# Patient Record
Sex: FEMALE | Race: WHITE | NOT HISPANIC OR LATINO
[De-identification: names, ages, dates, MRNs, and addresses within clinical notes are randomized per-mention and may not be internally consistent; named-entity substitution may affect disease eponyms.]

---

## 2023-05-02 ENCOUNTER — APPOINTMENT (OUTPATIENT)
Dept: INTERNAL MEDICINE | Facility: CLINIC | Age: 48
End: 2023-05-02
Payer: COMMERCIAL

## 2023-05-02 VITALS
WEIGHT: 143 LBS | SYSTOLIC BLOOD PRESSURE: 118 MMHG | BODY MASS INDEX: 27 KG/M2 | HEART RATE: 76 BPM | DIASTOLIC BLOOD PRESSURE: 83 MMHG | OXYGEN SATURATION: 98 % | TEMPERATURE: 98.4 F | HEIGHT: 61 IN

## 2023-05-02 DIAGNOSIS — Z80.0 FAMILY HISTORY OF MALIGNANT NEOPLASM OF DIGESTIVE ORGANS: ICD-10-CM

## 2023-05-02 DIAGNOSIS — Z00.00 ENCOUNTER FOR GENERAL ADULT MEDICAL EXAMINATION W/OUT ABNORMAL FINDINGS: ICD-10-CM

## 2023-05-02 DIAGNOSIS — E55.9 VITAMIN D DEFICIENCY, UNSPECIFIED: ICD-10-CM

## 2023-05-02 DIAGNOSIS — Z80.42 FAMILY HISTORY OF MALIGNANT NEOPLASM OF PROSTATE: ICD-10-CM

## 2023-05-02 DIAGNOSIS — Z82.49 FAMILY HISTORY OF ISCHEMIC HEART DISEASE AND OTHER DISEASES OF THE CIRCULATORY SYSTEM: ICD-10-CM

## 2023-05-02 DIAGNOSIS — F41.9 ANXIETY DISORDER, UNSPECIFIED: ICD-10-CM

## 2023-05-02 DIAGNOSIS — Z12.11 ENCOUNTER FOR SCREENING FOR MALIGNANT NEOPLASM OF COLON: ICD-10-CM

## 2023-05-02 DIAGNOSIS — Z78.9 OTHER SPECIFIED HEALTH STATUS: ICD-10-CM

## 2023-05-02 PROCEDURE — 99203 OFFICE O/P NEW LOW 30 MIN: CPT

## 2023-05-02 NOTE — PLAN
[FreeTextEntry1] : Ms. NAY KOLB 47 year  female  with a PMH anxiety, vitamin d deficiency  present to the office to establish care with a new pcp.\par Physical exam was performed. \par Recommend  to obtain lab results, EKG report from a previous PCP\par Obtain mammo, breast u/s, pap test report from a gyn\par Referred to see a GI for a screening colonoscopy(family h/o colon ca)\par Anxiety on/off, TOSIN 7 questioner was done score 4(no anxiety), referred patient to see a psychiatrist, rx for a xanax 0.25mg (10 tabs was issued)\par Multiple freckles - recommend to  apply sun screen lotion daily\par RTC to f/u after obtaining records of the results. \par

## 2023-05-02 NOTE — HISTORY OF PRESENT ILLNESS
[FreeTextEntry1] : New patient [de-identified] : Ms. NAY KOLB 47 year  female  with a PMH anxiety, vitamin d deficiency  present to the office to establish care with a new pcp. As per patient recently did CPE with her PCP(did blood test, ekg), as per patient everything was wnl. Currently patient feels good, denies complaints at present time. Wants to get rx for a xanax(time to times has an anxiety)\par

## 2023-05-02 NOTE — HEALTH RISK ASSESSMENT
[Good] : ~his/her~ current health as good [Fair] :  ~his/her~ mood as fair [No] : In the past 12 months have you used drugs other than those required for medical reasons? No [No falls in past year] : Patient reported no falls in the past year [0] : 2) Feeling down, depressed, or hopeless: Not at all (0) [PHQ-2 Negative - No further assessment needed] : PHQ-2 Negative - No further assessment needed [Patient reported mammogram was normal] : Patient reported mammogram was normal [Patient reported PAP Smear was normal] : Patient reported PAP Smear was normal [HIV Test offered] : HIV Test offered [Hepatitis C test offered] : Hepatitis C test offered [None] : None [Alone] : lives alone [Employed] : employed [Graduate School] : graduate school [Single] : single [Feels Safe at Home] : Feels safe at home [Fully functional (bathing, dressing, toileting, transferring, walking, feeding)] : Fully functional (bathing, dressing, toileting, transferring, walking, feeding) [Fully functional (using the telephone, shopping, preparing meals, housekeeping, doing laundry, using] : Fully functional and needs no help or supervision to perform IADLs (using the telephone, shopping, preparing meals, housekeeping, doing laundry, using transportation, managing medications and managing finances) [Smoke Detector] : smoke detector [Carbon Monoxide Detector] : carbon monoxide detector [Safety elements used in home] : safety elements used in home [Seat Belt] :  uses seat belt [Sunscreen] : uses sunscreen [With Patient/Caregiver] : , with patient/caregiver [Designated Healthcare Proxy] : Designated healthcare proxy [Name: ___] : Health Care Proxy's Name: [unfilled]  [Relationship: ___] : Relationship: [unfilled] [Aggressive treatment] : aggressive treatment [I will adhere to the patient's wishes.] : I will adhere to the patient's wishes. [Time Spent: ___ minutes] : Time Spent: [unfilled] minutes [Never] : Never [de-identified] : walking [de-identified] : regular [[id number]] : 0 [Change in mental status noted] : No change in mental status noted [Language] : denies difficulty with language [Learning/Retaining New Information] : denies difficulty learning/retaining new information [Handling Complex Tasks] : denies difficulty handling complex tasks [Sexually Active] : not sexually active [Reports changes in hearing] : Reports no changes in hearing [Reports changes in vision] : Reports no changes in vision [Reports changes in dental health] : Reports no changes in dental health [Guns at Home] : no guns at home [TB Exposure] : is not being exposed to tuberculosis [MammogramDate] : 01/23 [PapSmearDate] : 01/23 [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

## 2023-05-03 RX ORDER — ALPRAZOLAM 0.25 MG/1
0.25 TABLET ORAL DAILY
Qty: 10 | Refills: 0 | Status: ACTIVE | COMMUNITY
Start: 2023-05-02 | End: 1900-01-01

## 2023-06-07 ENCOUNTER — APPOINTMENT (OUTPATIENT)
Dept: GASTROENTEROLOGY | Facility: CLINIC | Age: 48
End: 2023-06-07
Payer: COMMERCIAL

## 2023-06-07 VITALS
HEART RATE: 83 BPM | BODY MASS INDEX: 26.77 KG/M2 | SYSTOLIC BLOOD PRESSURE: 110 MMHG | OXYGEN SATURATION: 98 % | TEMPERATURE: 97.7 F | WEIGHT: 141.8 LBS | HEIGHT: 61 IN | DIASTOLIC BLOOD PRESSURE: 66 MMHG

## 2023-06-07 DIAGNOSIS — Z78.9 OTHER SPECIFIED HEALTH STATUS: ICD-10-CM

## 2023-06-07 DIAGNOSIS — R13.10 DYSPHAGIA, UNSPECIFIED: ICD-10-CM

## 2023-06-07 DIAGNOSIS — Z12.11 ENCOUNTER FOR SCREENING FOR MALIGNANT NEOPLASM OF COLON: ICD-10-CM

## 2023-06-07 PROCEDURE — 99203 OFFICE O/P NEW LOW 30 MIN: CPT

## 2023-06-07 RX ORDER — ALPRAZOLAM 0.5 MG/1
0.5 TABLET ORAL
Refills: 0 | Status: DISCONTINUED | COMMUNITY
End: 2023-06-07

## 2023-06-08 NOTE — REASON FOR VISIT
From: Nilam Baeza  To: Walter Rodríguez OD  Sent: 8/4/2020 7:50 PM CDT  Subject: Other    Please send me the prescription for my eyeglasses.   [Initial Evaluation] : an initial evaluation [FreeTextEntry1] : Screening colonoscopy, dysphagia

## 2023-06-08 NOTE — CONSULT LETTER
[FreeTextEntry1] : Dear Dr. Waleska Otero,\par \par I had the pleasure of seeing your patient NAY KOLB in the office today.  My office note is attached. PLEASE READ THE "ASSESSMENT" SECTION OF THE NOTE TO SEE MY IMPRESSION AND PLAN.\par \par Thank you very much for allowing me to participate in the care of your patient.\par \par Sincerely,\par \par Michele Mckenna M.D., FAC, FACP\par Director, Celiac Program at [facility]\par  of Medicine, [facility] [employer] [facility] at [facility] Adjunct  of Medicine, [facility] Practice Director, [facility] [place]\[facility] [address]\[place] [address]\par Tel: [phone]\par Email: [email or web address] \par \par The attached note has been created using a voice recognition system (Dragon).  There may be some misspellings and typos.  Please call my office if you have any issues or questions.

## 2023-06-08 NOTE — HISTORY OF PRESENT ILLNESS
[FreeTextEntry1] : The patient is a 47-year-old woman who presents for an initial screening colonoscopy.  She has rare episodes of dysphagia to solids and occasional pain with swallowing.  She denies regurgitation.  She denies heartburn, nausea, vomiting, abdominal pain.  Of note, the patient's mother required esophageal dilatation for symptoms of dysphagia.  The patient reports 1 solid bowel movement a day without melena or bright red blood per rectum.  She has had mild weight gain.  She has never had a colonoscopy.  The patient has not been admitted to the hospital in the past year and denies any cardiac issues.

## 2023-06-08 NOTE — ASSESSMENT
[FreeTextEntry1] : Patient in need of an initial screening colonoscopy.  She has rare episodes of dysphagia to solids.\par \par An EGD and colonoscopy have been scheduled. The risks, benefits, alternatives, and limitations of the procedures, including the possibility of missed lesions, were explained.

## 2023-07-18 ENCOUNTER — APPOINTMENT (OUTPATIENT)
Dept: GASTROENTEROLOGY | Facility: AMBULATORY MEDICAL SERVICES | Age: 48
End: 2023-07-18
Payer: COMMERCIAL

## 2023-07-18 PROCEDURE — G0121 COLON CA SCRN NOT HI RSK IND: CPT

## 2023-07-18 PROCEDURE — 43239 EGD BIOPSY SINGLE/MULTIPLE: CPT | Mod: 59

## 2023-08-06 ENCOUNTER — NON-APPOINTMENT (OUTPATIENT)
Age: 48
End: 2023-08-06

## 2023-11-27 DIAGNOSIS — R92.8 OTHER ABNORMAL AND INCONCLUSIVE FINDINGS ON DIAGNOSTIC IMAGING OF BREAST: ICD-10-CM

## 2024-01-29 ENCOUNTER — TRANSCRIPTION ENCOUNTER (OUTPATIENT)
Age: 49
End: 2024-01-29

## 2024-03-28 ENCOUNTER — APPOINTMENT (OUTPATIENT)
Dept: PSYCHIATRY | Facility: CLINIC | Age: 49
End: 2024-03-28
Payer: COMMERCIAL

## 2024-03-28 DIAGNOSIS — F41.9 ANXIETY DISORDER, UNSPECIFIED: ICD-10-CM

## 2024-03-28 PROCEDURE — 99417 PROLNG OP E/M EACH 15 MIN: CPT

## 2024-03-28 PROCEDURE — 99205 OFFICE O/P NEW HI 60 MIN: CPT

## 2024-03-28 RX ORDER — SODIUM PICOSULFATE, MAGNESIUM OXIDE, AND ANHYDROUS CITRIC ACID 12; 3.5; 1 G/175ML; G/175ML; MG/175ML
10-3.5-12 MG-GM LIQUID ORAL
Qty: 2 | Refills: 0 | Status: DISCONTINUED | COMMUNITY
Start: 2023-06-07 | End: 2024-03-28

## 2024-03-28 NOTE — HISTORY OF PRESENT ILLNESS
[FreeTextEntry1] : 47 yo F with history of anxiety & vitamin D deficiency presenting for intake evaluation for psych med management, interested in being screened for depression.   DEPRESSION Mood- "mildly blah" Sleep- "great", on avg 6-7 hours, sleeps through the night Appetite- at baseline  Motivation- lower than usual, said "nothing rising to the level of dysfunction", might put off laundry, having a harder time getting to work on time  Anhedonia- doesn't enjoy doing things on her own as much as she used to Energy- overall slightly lower than her baseline Worthlessness- denied  Hopelessness- denied  Difficulty concentrating/making decisions- yes, gets distracted sometimes  Psychomotor changes- denied  SI- denied HI- denied Impact on ADLs/Functioning- denied   ANXIETY  Onset- 5 to 6 years ago in setting of difficulties in relationship at the time, noticed she was experienced recurrent tightness in her chest. Coworker offered her a Xanax which helped. Saw a psychiatrist afterward 1x who wrote prescription for Xanax for 30-40 pills that lasted for nearly 5 years. Most recently PCP prescribed her 10 tabs. She has also been taking her mom's Xanax prescription. Typically takes one in advance of certain social situations that she finds particularly anxiety provoking. Said she almost always takes one before going on a date. Nervousness- sometimes  Tension- yes, holding her breath  Excessive Worrying- denied. "I do worry but it doesn't take up my whole head space" Specific Phobias- denied  Panic Attacks- denied Somatic Symptoms- occasional shortness of breath, chest tightness  Impact on Sleep- denied  Racing Thoughts- denied  Agoraphobia- denied  Context/Stressors- "Comes and goes" in social situations, dating situations, issues with bisexuality which has been difficult in [employer] community, some stress at work, parents going through health situations Interference with ADLs- denied   Reported intermittent compulsion to stare at women's bodies with difficulty averting her gaze, which has been distressing in that she feels it is noticeable to others. Denies intrusive thoughts, other compulsions or tics.  Psych Review of Systems: Denied history of persistently sad/irritable mood, denied neurovegetative symptoms.  Denied history of periods of abnormally/persistently elevated or irritable mood associated with changes in activity, energy, sleep or behavior. Denied periods of excessive anxiety or worry that interferes with functioning. Denied history of panic attacks and agoraphobia.  Denied history of persistent patterns and impairment in functioning due to inattention, hyperactivity and impulsivity. Denied history of auditory and visual hallucinations. Denied history of paranoia, delusions to their knowledge. Denied history of exposure to trauma and subsequent intrusive symptoms, changes in mood/cognition and alterations in arousal or reactivity.                [FreeTextEntry2] : -Past or Current Mental Health Providers: Saw a psychiatrist once in the past briefly for an evaluation but not in an ongoing way. Currently in psychotherapy with provider in private practice- Yeny De Paz, has been seeing her for several years, typically weekly.  -Historical Diagnoses: anxiety d/o- "mild/situational" -Prior Psychiatric Hospitalizations: denied -Prior Suicidal Ideation/Attempts: denied -History of self-injurious behavior: denied -History of assaultive or violent behavior: denied     Substance Use History: -Nicotine: denied -Alcohol: socially, once a month will have one or two drinks  -Cannabis: denied  -Caffeine: cup of coffee every morning  -Other Illicit drugs: denied -Medical/social/legal problems related to substance use: denied -Prior Rehab Tx: denied     [FreeTextEntry3] : Current psych meds: -alprazolam 0.25 mg daily PRN (rx by PCP), previously had prescription for 0.5 mg tabs from a psychiatrist.   Past psych meds: -escitalopram (for month, caused stomach issues)  PDMP reviewed, ref # [id number].  One prescription for alprazolam 0.25 mg tabs,  #10 tabs, rx by Waleska Otero

## 2024-03-28 NOTE — PHYSICAL EXAM
[None] : none [Average] : average [Cooperative] : cooperative [Euthymic] : euthymic [Full] : full [Clear] : clear [Linear/Goal Directed] : linear/goal directed [WNL] : within normal limits [FreeTextEntry8] : "mildly blah', "vanilla"

## 2024-03-28 NOTE — DISCUSSION/SUMMARY
[Low acute suicide risk] : Low acute suicide risk [No] : No [Not clinically indicated] : Safety Plan completed/updated (for individuals at risk): Not clinically indicated [FreeTextEntry1] : 47 yo F with history of anxiety treated with alprazolam PRN prescribed by her PCP presenting for intake evaluation for psych med management. Patient seeking evaluation for depression in setting of low motivation.   Based on clinical interview in combination with screening tools, patient does not meet criteria for depressive disorder. Psychopharmacologic intervention not warranted however would benefit from continuing psychotherapy to address issues around low motivation. Describes some symptoms associated with social anxiety disorder which do not quite meet criteria for this diagnosis, however she may still benefit from treatment with an SSRI. Treatment with benzodiazepine would not be indicated.

## 2024-03-28 NOTE — PLAN
[FreeTextEntry4] : 1. Non-pathologic Shyness/Performance Anxiety vs. Social Anxiety Disorder --Reasonable to do a trial of SSRI --Benzodiazepine not indicated --For as needed anxiety, can try hydroxyzine 25 mg q8hrn PRN anxiety    -[place]  reviewed prior to all controlled substance prescriptions. - I have given my usual talk about the risks and benefits of all treatment recommendations including alternatives and the risks and benefits of no treatment at all. Patient had the opportunity to have all questions answered to their satisfaction. They expressed understanding and agreement with the above treatment plan. - Educated patient of importance of remaining abstinent from drugs and alcohol. - Discussed that unpredictable effects including cardiorespiratory collapse can result from the combination of illicit drugs and prescribed medications. Patient verbalized understanding. - Emergency procedures were discussed: pt. educated to call 911 or go to nearest ER for worsening of symptoms/suicidal/homicidal ideation. - Patient given opportunity to ask questions and their questions were answered and they expressed understanding and agreement with above plan.   I spent a total of 90 minutes for today's visit in evaluating and treating the patient as per above, including preparing for patient's appointment (review of prior documents, [facility] ), performing a medically necessary exam/evaluation, communicating/counseling/educating the patient ordering medications, documenting clinical information in the EHR.

## 2024-03-28 NOTE — SOCIAL HISTORY
[FreeTextEntry1] : -[place]: [place], [place], moved to [place] in 2001 after graduating college.  -Current living situation: In apartment in [place], [place], no roommate  -Parents/Siblings/Childhood: parents are , sister (younger), 3 brothers (all younger). Recalls childhood as "generally happy".  -Highest Level of Education: Undergrad at [employer], Grad school at [employer] -Work History: CPA for small company  -Romantic relationships: single, never  -Children: denied -History of Trauma: denied, denied abuse -Legal History: Patient denies recent or remote hx of legal problems. -Access to firearms: patient denies.

## 2024-03-28 NOTE — RISK ASSESSMENT
[Clinical Interview] : Clinical Interview [No] : No [None in the patient's lifetime] : None in the patient's lifetime [None Known] : none known

## 2024-03-28 NOTE — PAST MEDICAL HISTORY
[FreeTextEntry1] : -Hx of low testosterone several years ago   PCP: Waleska Otero  OTC medications: denies Hx of TBI: denies Hx of Seizures: denies Hx of Migraine HA: denies  No labs or EKG available in chart for review.

## 2025-02-27 ENCOUNTER — APPOINTMENT (OUTPATIENT)
Dept: INTERNAL MEDICINE | Facility: CLINIC | Age: 50
End: 2025-02-27
Payer: COMMERCIAL

## 2025-02-27 ENCOUNTER — NON-APPOINTMENT (OUTPATIENT)
Age: 50
End: 2025-02-27

## 2025-02-27 VITALS
HEART RATE: 71 BPM | HEIGHT: 61 IN | DIASTOLIC BLOOD PRESSURE: 72 MMHG | SYSTOLIC BLOOD PRESSURE: 139 MMHG | WEIGHT: 140 LBS | BODY MASS INDEX: 26.43 KG/M2 | TEMPERATURE: 98 F | OXYGEN SATURATION: 98 %

## 2025-02-27 DIAGNOSIS — Z13.220 ENCOUNTER FOR SCREENING FOR LIPOID DISORDERS: ICD-10-CM

## 2025-02-27 DIAGNOSIS — Z13.1 ENCOUNTER FOR SCREENING FOR DIABETES MELLITUS: ICD-10-CM

## 2025-02-27 DIAGNOSIS — Z12.83 ENCOUNTER FOR SCREENING FOR MALIGNANT NEOPLASM OF SKIN: ICD-10-CM

## 2025-02-27 DIAGNOSIS — Z00.00 ENCOUNTER FOR GENERAL ADULT MEDICAL EXAMINATION W/OUT ABNORMAL FINDINGS: ICD-10-CM

## 2025-02-27 DIAGNOSIS — E55.9 VITAMIN D DEFICIENCY, UNSPECIFIED: ICD-10-CM

## 2025-02-27 DIAGNOSIS — F41.9 ANXIETY DISORDER, UNSPECIFIED: ICD-10-CM

## 2025-02-27 PROCEDURE — 36415 COLL VENOUS BLD VENIPUNCTURE: CPT

## 2025-02-27 PROCEDURE — 99396 PREV VISIT EST AGE 40-64: CPT

## 2025-02-27 PROCEDURE — 93000 ELECTROCARDIOGRAM COMPLETE: CPT

## 2025-02-27 RX ORDER — SERTRALINE 25 MG/1
25 TABLET, FILM COATED ORAL
Qty: 30 | Refills: 2 | Status: ACTIVE | COMMUNITY
Start: 2025-02-27 | End: 1900-01-01

## 2025-03-03 LAB
25(OH)D3 SERPL-MCNC: 20.4 NG/ML
ALBUMIN SERPL ELPH-MCNC: 4.7 G/DL
ALP BLD-CCNC: 55 U/L
ALT SERPL-CCNC: 28 U/L
ANION GAP SERPL CALC-SCNC: 12 MMOL/L
AST SERPL-CCNC: 22 U/L
BASOPHILS # BLD AUTO: 0.04 K/UL
BASOPHILS NFR BLD AUTO: 0.4 %
BILIRUB SERPL-MCNC: 0.2 MG/DL
BUN SERPL-MCNC: 11 MG/DL
CALCIUM SERPL-MCNC: 9.9 MG/DL
CHLORIDE SERPL-SCNC: 105 MMOL/L
CHOLEST SERPL-MCNC: 189 MG/DL
CO2 SERPL-SCNC: 24 MMOL/L
CREAT SERPL-MCNC: 0.76 MG/DL
EGFR: 97 ML/MIN/1.73M2
EOSINOPHIL # BLD AUTO: 0.05 K/UL
EOSINOPHIL NFR BLD AUTO: 0.5 %
ESTIMATED AVERAGE GLUCOSE: 97 MG/DL
GLUCOSE SERPL-MCNC: 90 MG/DL
HBA1C MFR BLD HPLC: 5 %
HCT VFR BLD CALC: 43.1 %
HDLC SERPL-MCNC: 53 MG/DL
HGB BLD-MCNC: 14.3 G/DL
IMM GRANULOCYTES NFR BLD AUTO: 0.4 %
IRON SATN MFR SERPL: 19 %
IRON SERPL-MCNC: 64 UG/DL
LDLC SERPL CALC-MCNC: 111 MG/DL
LYMPHOCYTES # BLD AUTO: 2.12 K/UL
LYMPHOCYTES NFR BLD AUTO: 22.6 %
MAN DIFF?: NORMAL
MCHC RBC-ENTMCNC: 31.2 PG
MCHC RBC-ENTMCNC: 33.2 G/DL
MCV RBC AUTO: 94.1 FL
MONOCYTES # BLD AUTO: 0.58 K/UL
MONOCYTES NFR BLD AUTO: 6.2 %
NEUTROPHILS # BLD AUTO: 6.53 K/UL
NEUTROPHILS NFR BLD AUTO: 69.9 %
NONHDLC SERPL-MCNC: 136 MG/DL
PLATELET # BLD AUTO: 211 K/UL
POTASSIUM SERPL-SCNC: 4.6 MMOL/L
PROT SERPL-MCNC: 7.2 G/DL
RBC # BLD: 4.58 M/UL
RBC # FLD: 12.9 %
SODIUM SERPL-SCNC: 141 MMOL/L
TIBC SERPL-MCNC: 329 UG/DL
TRIGL SERPL-MCNC: 141 MG/DL
TSH SERPL-ACNC: 2.3 UIU/ML
UIBC SERPL-MCNC: 265 UG/DL
WBC # FLD AUTO: 9.36 K/UL

## 2025-03-03 RX ORDER — ERGOCALCIFEROL 1.25 MG/1
50000 CAPSULE ORAL
Qty: 4 | Refills: 2 | Status: ACTIVE | COMMUNITY
Start: 2025-03-03 | End: 1900-01-01

## 2025-05-13 ENCOUNTER — RX RENEWAL (OUTPATIENT)
Age: 50
End: 2025-05-13

## 2025-05-13 RX ORDER — ERGOCALCIFEROL 1.25 MG/1
1.25 MG CAPSULE, LIQUID FILLED ORAL
Qty: 4 | Refills: 0 | Status: ACTIVE | COMMUNITY
Start: 2025-05-13 | End: 1900-01-01

## 2025-05-15 ENCOUNTER — RX RENEWAL (OUTPATIENT)
Age: 50
End: 2025-05-15

## 2025-08-08 ENCOUNTER — RX RENEWAL (OUTPATIENT)
Age: 50
End: 2025-08-08

## 2025-09-16 ENCOUNTER — TRANSCRIPTION ENCOUNTER (OUTPATIENT)
Age: 50
End: 2025-09-16